# Patient Record
Sex: FEMALE | Race: WHITE | URBAN - NONMETROPOLITAN AREA
[De-identification: names, ages, dates, MRNs, and addresses within clinical notes are randomized per-mention and may not be internally consistent; named-entity substitution may affect disease eponyms.]

---

## 2017-01-28 ENCOUNTER — HISTORY (OUTPATIENT)
Dept: FAMILY MEDICINE | Facility: OTHER | Age: 52
End: 2017-01-28

## 2017-01-28 ENCOUNTER — OFFICE VISIT - GICH (OUTPATIENT)
Dept: FAMILY MEDICINE | Facility: OTHER | Age: 52
End: 2017-01-28

## 2017-01-28 DIAGNOSIS — J01.10 ACUTE FRONTAL SINUSITIS: ICD-10-CM

## 2017-01-28 DIAGNOSIS — J01.00 ACUTE MAXILLARY SINUSITIS: ICD-10-CM

## 2017-01-28 DIAGNOSIS — J06.9 ACUTE UPPER RESPIRATORY INFECTION: ICD-10-CM

## 2018-01-03 NOTE — PATIENT INSTRUCTIONS
Patient Information     Patient Name MRN Melly Stafford [id number] Female 1965      Patient Instructions by Mary Slater NP at 2017 12:20 PM     Author:  Mary Slater NP  Service:  (none) Author Type:  PHYS- Nurse Practitioner     Filed:  2017 12:21 PM  Encounter Date:  2017 Status:  Addendum     :  Mary Slater NP (PHYS- Nurse Practitioner)        Related Notes: Original Note by Mary Slater NP (PHYS- Nurse Practitioner) filed at 2017 12:20 PM            Zyrtec D - may take twice daily as needed     Azithromycin daily x 5 days     Follow up if symptoms persist or worsen       Index [place] Related topics   Sinusitis   ________________________________________________________________________  KEY POINTS    Sinusitis is swelling and irritation of the linings of the sinuses, the hollow spaces in the bones of your face and front of your skull.    You may need to take medicine for your stuffy nose, pain, infection, or swelling.    Use saline nasal sprays or rinses to help wash out nasal passages if you have a sinus infection. A humidifier can add moisture to the air also.  ________________________________________________________________________  What is sinusitis?  Sinusitis is swelling and irritation of the linings of the sinuses. The sinuses are hollow spaces in the bones of your face and front of your skull. They connect with the nose through small openings. Like the nose, they are lined with tissue (membranes) that make mucus. Mucus drains through the small openings to the nose.  What is the cause?  The passageways from the sinuses to the nose are very narrow. When drainage of mucus from the sinuses is blocked, the sinuses get swollen and irritated. They may also become infected with bacteria, a virus, or even fungus. Allergies or irritation from pollen, mold, dust, or smoke can also cause swelling of the sinuses. Sometimes a tooth infection  spreads to the sinuses.  You may be more likely to get sinus congestion and infections if you have:    Severe or untreated seasonal or year-round allergies    Injured the bones in your nose    A deformity of the nose that causes the sinuses not to drain properly    Small growths called polyps in the sinuses that partially block the sinus openings  What are the symptoms?  Symptoms may include:    Feeling of fullness or pressure in your face or head    A headache that is most painful when you first wake up in the morning or when you bend over and put your head down    Pain in your face    Aching in the upper jaw and teeth    Runny or stuffy nose    Cough, especially at night    Fluid draining down the back of your throat (postnasal drip)    Sore throat, especially in the morning or evening  How is it diagnosed?  Your healthcare provider will ask about your symptoms and medical history and examine you. Tests are often not needed but may include:    X-ray of your sinuses    CT scan, which uses X-rays and a computer to show detailed pictures of the sinuses  How is it treated?  Several kinds of medicine may help:    Nonprescription pain medicine, such as acetaminophen, ibuprofen, or naproxen. Read the label and take as directed. Unless recommended by your healthcare provider, you should not take these medicines for more than 10 days.    Nonsteroidal anti-inflammatory medicines (NSAIDs), such as ibuprofen, naproxen, and aspirin, may cause stomach bleeding and other problems. These risks increase with age.    Acetaminophen may cause liver damage or other problems. Unless recommended by your provider, don't take more than 3000 milligrams (mg) in 24 hours. To make sure you don t take too much, check other medicines you take to see if they also contain acetaminophen. Ask your provider if you need to avoid drinking alcohol while taking this medicine.    Decongestants pills or nasal sprays to reduce swelling in your nose and  sinuses and lessen the amount of mucus. Use decongestants as directed. If you are using a nonprescription nasal-spray decongestant, generally you should not use it for more than 3 days. After 3 days it may make your symptoms worse. Ask your healthcare provider if it is OK for you to use a nasal spray decongestant longer than this.    Antihistamine tablets or a nasal spray to treat the allergies during your allergy season or, in some cases, year-round. Antihistamines block the effect of a chemical your body makes when you have an allergic reaction.    Antibiotics, if your provider thinks you might have a sinus infection    Steroid nasal spray if your provider thinks it may help clear your sinuses  If sinusitis is still a problem despite treatment, you may be referred to an allergy specialist or an ear, nose, and throat (ENT) specialist. The allergy specialist will check for and help treat your allergies to lessen the chance of having sinusitis. The ENT specialist will check for polyps or a deformed bone that may be blocking your sinuses. You may need surgery to create an extra or enlarged passageway to help your sinuses drain more easily.  Depending on what caused the sinusitis and how severe it is, it may last for days or weeks. For most cases of sinusitis, the symptoms get better gradually over 3 to 10 days.  How can I take care of myself?  Follow the full course of treatment prescribed by your healthcare provider. In addition:    If you are taking an antibiotic, take all of it as directed by your provider. If you stop taking the medicine when your symptoms are gone but before you have taken all of the medicine, symptoms may come back.    Don t smoke, and stay away from others who are smoking.    If you have allergies, try to avoid the things you are allergic to, like animal dander. Use medicine to keep your nose and sinuses open.    Use a humidifier to put more moisture in the air. This can help to open blocked  sinuses and relieve pain. Avoid steam vaporizers because they can cause burns. Be sure to keep the humidifier clean, as recommended in the 's instructions. It's important to keep bacteria and mold from growing in the water container.    Use saline nasal sprays or rinses to help wash out nasal passages if you have a sinus infection. You may use the sprays also to prevent infections.    Get plenty of rest.    Drink more fluids to keep the mucus as thin as possible so your sinuses can drain more easily.    Raise the head of your bed slightly or sleep on extra pillows to help your sinuses drain.    Put warm, moist cloths on painful areas.  Ask your healthcare provider:    How and when you will get your test results    How long it will take to recover    If there are activities you should avoid and when you can return to your normal activities    How to take care of yourself at home    What symptoms or problems you should watch for and what to do if you have them  Make sure you know when you should come back for a checkup. Keep all appointments for provider visits or tests.  How can I help prevent sinusitis?    Treat your colds and allergies promptly. Use decongestants as soon as you start having symptoms, and before you fly, travel to high altitudes, or swim in deep water.    If you smoke, try to quit. Talk to your healthcare provider about ways to quit smoking.  Developed by [employer].  Adult Advisor 2016.2 published by [employer].  Last modified: 2016-03-23  Last reviewed: 2015-08-27  This content is reviewed periodically and is subject to change as new health information becomes available. The information is intended to inform and educate and is not a replacement for medical evaluation, advice, diagnosis or treatment by a healthcare professional.  References   Adult Advisor 2016.2 Index    Copyright   2016 [employer], a division of [employer] All rights reserved.

## 2018-01-03 NOTE — NURSING NOTE
Patient Information     Patient Name MRN Melly Stafford [id number] Female 1965      Nursing Note by Odalis Awad at 2017 11:45 AM     Author:  Odalis Awad Service:  (none) Author Type:  (none)     Filed:  2017 12:12 PM Encounter Date:  2017 Status:  Signed     :  Odalis Awad            Patient presents today for sinus pain, headache, congestion, jaw pain, dizziness, cough. Began Monday night.    Odalis Awad LPN ....................  2017   11:59 AM

## 2018-01-03 NOTE — PROGRESS NOTES
"Patient Information     Patient Name MRN Melly Stafford [id number] Female 1965      Progress Notes by Mary Slater NP at 2017 11:45 AM     Author:  Mary Slater NP Service:  (none) Author Type:  PHYS- Nurse Practitioner     Filed:  2017  2:08 PM Encounter Date:  2017 Status:  Signed     :  Mary Slater NP (PHYS- Nurse Practitioner)            HPI:    Melly Marquez is a 51 y.o. female who presents to clinic today for sinus.  Sinus congestion, pressure, pain, dizziness, jaw/teeth pain, sinus headache, and cough for the past 5 days.  Some yellow/green nasal drainage.  Some blood tinged nasal drainage.  Coughing up phlegm.  No sore throat.  No ear pain.  States history of sinus infections.  States low grade fever and chills last night.  Appetite decreased.  Energy decreased.  Takes Zyrtec daily.  Taking Nyquil, Dayquil, Sudafed, Ibuprofen and Afrin nasal spray.  Using netti pot twice daily.            Past Medical History     Diagnosis  Date     RAD (reactive airway disease)      No past surgical history on file.  Social History     Substance Use Topics       Smoking status: Never Smoker     Smokeless tobacco: Never Used     Alcohol use No     Current Outpatient Prescriptions       Medication  Sig Dispense Refill     cetirizine (ZYRTEC) 10 mg tablet Take 10 mg by mouth once daily.       No current facility-administered medications for this visit.      Medications have been reviewed by me and are current to the best of my knowledge and ability.    No Known Allergies    ROS:  Refer to HPI    Visit Vitals       /82     Temp 97.5  F (36.4  C) (Tympanic)     Resp 18     Ht 1.803 m (5' 11\")     Wt 78.7 kg (173 lb 9.6 oz)     Breastfeeding No     BMI 24.21 kg/m2       EXAM:  General Appearance: Miserable appearing adult female, appropriate appearance for age. No acute distress  Head: normocephalic, atraumatic  Ears: Left TM with bony landmarks " appreciated, no erythema, no effusion, no bulging, no purulence.  Right TM with bony landmarks appreciated, no erythema, no effusion, no bulging, no purulence.   Left auditory canal clear.  Right auditory canal clear.  Normal external ears, non tender.  Eyes: conjunctivae normal, no drainage  Orophayrnx: moist mucous membranes, posterior pharynx with erythema, tonsils without hypertrophy, no erythema, no exudates or petechiae, mild post nasal drip seen.    Bilateral sinus tendernes upon palpation of the frontal and maxillary sinuses  Nose:  Bilateral nares with erythema and edema  Neck: tonsillar lymph nodes with enlargement on palpation   Respiratory: normal chest wall and respirations.  Normal effort.  Clear to auscultation bilaterally, no wheezes or rhonchi or congestion, no cough appreciated  Cardiac: RRR with no murmurs  Psychological: normal affect, alert and pleasant    ASSESSMENT/PLAN:    ICD-10-CM    1. Acute frontal sinusitis, recurrence not specified J01.10 azithromycin (ZITHROMAX Z-ERIKA) 250 mg tablet      cetirizine-pseudoephedrine, 5-120 mg, (ZYRTEC-D) 5-120 mg tablet   2. Acute maxillary sinusitis, recurrence not specified J01.00 azithromycin (ZITHROMAX Z-ERIKA) 250 mg tablet      cetirizine-pseudoephedrine, 5-120 mg, (ZYRTEC-D) 5-120 mg tablet   3. Upper respiratory tract infection, unspecified type J06.9          Azithromycin daily x 5 days (z erika) dosing  Zyrtec D BID PRN x 5 days   Encouraged fluids  Symptomatic treatment - salt water gargles, honey, elevation, humidifier, sinus rinse/netti pot, saline nasal spray, lozenges, etc   Tylenol or ibuprofen PRN  Follow up if symptoms persist or worsen or concerns        Patient Instructions   Zyrtec D - may take twice daily as needed     Azithromycin daily x 5 days     Follow up if symptoms persist or worsen       Index [place] Related topics   Sinusitis   ________________________________________________________________________  KEY POINTS    Sinusitis is  swelling and irritation of the linings of the sinuses, the hollow spaces in the bones of your face and front of your skull.    You may need to take medicine for your stuffy nose, pain, infection, or swelling.    Use saline nasal sprays or rinses to help wash out nasal passages if you have a sinus infection. A humidifier can add moisture to the air also.  ________________________________________________________________________  What is sinusitis?  Sinusitis is swelling and irritation of the linings of the sinuses. The sinuses are hollow spaces in the bones of your face and front of your skull. They connect with the nose through small openings. Like the nose, they are lined with tissue (membranes) that make mucus. Mucus drains through the small openings to the nose.  What is the cause?  The passageways from the sinuses to the nose are very narrow. When drainage of mucus from the sinuses is blocked, the sinuses get swollen and irritated. They may also become infected with bacteria, a virus, or even fungus. Allergies or irritation from pollen, mold, dust, or smoke can also cause swelling of the sinuses. Sometimes a tooth infection spreads to the sinuses.  You may be more likely to get sinus congestion and infections if you have:    Severe or untreated seasonal or year-round allergies    Injured the bones in your nose    A deformity of the nose that causes the sinuses not to drain properly    Small growths called polyps in the sinuses that partially block the sinus openings  What are the symptoms?  Symptoms may include:    Feeling of fullness or pressure in your face or head    A headache that is most painful when you first wake up in the morning or when you bend over and put your head down    Pain in your face    Aching in the upper jaw and teeth    Runny or stuffy nose    Cough, especially at night    Fluid draining down the back of your throat (postnasal drip)    Sore throat, especially in the morning or evening  How  is it diagnosed?  Your healthcare provider will ask about your symptoms and medical history and examine you. Tests are often not needed but may include:    X-ray of your sinuses    CT scan, which uses X-rays and a computer to show detailed pictures of the sinuses  How is it treated?  Several kinds of medicine may help:    Nonprescription pain medicine, such as acetaminophen, ibuprofen, or naproxen. Read the label and take as directed. Unless recommended by your healthcare provider, you should not take these medicines for more than 10 days.    Nonsteroidal anti-inflammatory medicines (NSAIDs), such as ibuprofen, naproxen, and aspirin, may cause stomach bleeding and other problems. These risks increase with age.    Acetaminophen may cause liver damage or other problems. Unless recommended by your provider, don't take more than 3000 milligrams (mg) in 24 hours. To make sure you don t take too much, check other medicines you take to see if they also contain acetaminophen. Ask your provider if you need to avoid drinking alcohol while taking this medicine.    Decongestants pills or nasal sprays to reduce swelling in your nose and sinuses and lessen the amount of mucus. Use decongestants as directed. If you are using a nonprescription nasal-spray decongestant, generally you should not use it for more than 3 days. After 3 days it may make your symptoms worse. Ask your healthcare provider if it is OK for you to use a nasal spray decongestant longer than this.    Antihistamine tablets or a nasal spray to treat the allergies during your allergy season or, in some cases, year-round. Antihistamines block the effect of a chemical your body makes when you have an allergic reaction.    Antibiotics, if your provider thinks you might have a sinus infection    Steroid nasal spray if your provider thinks it may help clear your sinuses  If sinusitis is still a problem despite treatment, you may be referred to an allergy specialist or an  ear, nose, and throat (ENT) specialist. The allergy specialist will check for and help treat your allergies to lessen the chance of having sinusitis. The ENT specialist will check for polyps or a deformed bone that may be blocking your sinuses. You may need surgery to create an extra or enlarged passageway to help your sinuses drain more easily.  Depending on what caused the sinusitis and how severe it is, it may last for days or weeks. For most cases of sinusitis, the symptoms get better gradually over 3 to 10 days.  How can I take care of myself?  Follow the full course of treatment prescribed by your healthcare provider. In addition:    If you are taking an antibiotic, take all of it as directed by your provider. If you stop taking the medicine when your symptoms are gone but before you have taken all of the medicine, symptoms may come back.    Don t smoke, and stay away from others who are smoking.    If you have allergies, try to avoid the things you are allergic to, like animal dander. Use medicine to keep your nose and sinuses open.    Use a humidifier to put more moisture in the air. This can help to open blocked sinuses and relieve pain. Avoid steam vaporizers because they can cause burns. Be sure to keep the humidifier clean, as recommended in the 's instructions. It's important to keep bacteria and mold from growing in the water container.    Use saline nasal sprays or rinses to help wash out nasal passages if you have a sinus infection. You may use the sprays also to prevent infections.    Get plenty of rest.    Drink more fluids to keep the mucus as thin as possible so your sinuses can drain more easily.    Raise the head of your bed slightly or sleep on extra pillows to help your sinuses drain.    Put warm, moist cloths on painful areas.  Ask your healthcare provider:    How and when you will get your test results    How long it will take to recover    If there are activities you should  avoid and when you can return to your normal activities    How to take care of yourself at home    What symptoms or problems you should watch for and what to do if you have them  Make sure you know when you should come back for a checkup. Keep all appointments for provider visits or tests.  How can I help prevent sinusitis?    Treat your colds and allergies promptly. Use decongestants as soon as you start having symptoms, and before you fly, travel to high altitudes, or swim in deep water.    If you smoke, try to quit. Talk to your healthcare provider about ways to quit smoking.  Developed by [employer].  Adult Advisor 2016.2 published by [employer].  Last modified: 2016-03-23  Last reviewed: 2015-08-27  This content is reviewed periodically and is subject to change as new health information becomes available. The information is intended to inform and educate and is not a replacement for medical evaluation, advice, diagnosis or treatment by a healthcare professional.  References   Adult Advisor 2016.2 Index    Copyright   2016 [employer], a division of [employer] All rights reserved.

## 2018-01-26 VITALS
WEIGHT: 173.6 LBS | DIASTOLIC BLOOD PRESSURE: 82 MMHG | SYSTOLIC BLOOD PRESSURE: 115 MMHG | HEIGHT: 71 IN | TEMPERATURE: 97.5 F | RESPIRATION RATE: 18 BRPM | BODY MASS INDEX: 24.3 KG/M2

## 2018-02-26 ENCOUNTER — DOCUMENTATION ONLY (OUTPATIENT)
Dept: FAMILY MEDICINE | Facility: OTHER | Age: 53
End: 2018-02-26

## 2018-02-26 PROBLEM — J45.909 RAD (REACTIVE AIRWAY DISEASE): Status: ACTIVE | Noted: 2018-02-26

## 2018-02-26 RX ORDER — CETIRIZINE HYDROCHLORIDE 10 MG/1
10 TABLET ORAL DAILY
COMMUNITY

## 2018-02-26 RX ORDER — CETIRIZINE HYDROCHLORIDE, PSEUDOEPHEDRINE HYDROCHLORIDE 5; 120 MG/1; MG/1
1 TABLET, FILM COATED, EXTENDED RELEASE ORAL EVERY 12 HOURS PRN
COMMUNITY
Start: 2017-01-28